# Patient Record
Sex: MALE | Race: WHITE
[De-identification: names, ages, dates, MRNs, and addresses within clinical notes are randomized per-mention and may not be internally consistent; named-entity substitution may affect disease eponyms.]

---

## 2019-01-11 ENCOUNTER — HOSPITAL ENCOUNTER (OUTPATIENT)
Dept: HOSPITAL 95 - ORSCSDS | Age: 69
Discharge: HOME | End: 2019-01-11
Attending: INTERNAL MEDICINE
Payer: MEDICARE

## 2019-01-11 VITALS — BODY MASS INDEX: 24.22 KG/M2 | WEIGHT: 159.79 LBS | HEIGHT: 67.99 IN

## 2019-01-11 DIAGNOSIS — Z12.11: Primary | ICD-10-CM

## 2019-01-11 DIAGNOSIS — Z83.71: ICD-10-CM

## 2019-01-11 DIAGNOSIS — K64.8: ICD-10-CM

## 2019-01-11 DIAGNOSIS — K57.30: ICD-10-CM

## 2019-01-11 DIAGNOSIS — I10: ICD-10-CM

## 2019-01-11 DIAGNOSIS — Z79.82: ICD-10-CM

## 2019-01-11 DIAGNOSIS — Z79.899: ICD-10-CM

## 2019-01-11 PROCEDURE — 0DJD8ZZ INSPECTION OF LOWER INTESTINAL TRACT, VIA NATURAL OR ARTIFICIAL OPENING ENDOSCOPIC: ICD-10-PCS | Performed by: INTERNAL MEDICINE

## 2019-01-11 NOTE — NUR
01/11/19 1121 Laura Hampton
RECIEVED REPORT FROM [facility].[id number]